# Patient Record
Sex: MALE | URBAN - METROPOLITAN AREA
[De-identification: names, ages, dates, MRNs, and addresses within clinical notes are randomized per-mention and may not be internally consistent; named-entity substitution may affect disease eponyms.]

---

## 2020-10-13 ENCOUNTER — OFFICE VISIT (OUTPATIENT)
Dept: URBAN - METROPOLITAN AREA CLINIC 52 | Facility: CLINIC | Age: 70
End: 2020-10-13
Payer: MEDICARE

## 2020-10-13 DIAGNOSIS — H25.813 COMBINED FORMS OF AGE-RELATED CATARACT, BILATERAL: ICD-10-CM

## 2020-10-13 DIAGNOSIS — E11.9 TYPE 2 DIABETES MELLITUS WITHOUT COMPLICATIONS: Primary | ICD-10-CM

## 2020-10-13 DIAGNOSIS — H43.813 VITREOUS DEGENERATION, BILATERAL: ICD-10-CM

## 2020-10-13 PROCEDURE — 92004 COMPRE OPH EXAM NEW PT 1/>: CPT | Performed by: OPHTHALMOLOGY

## 2020-10-13 ASSESSMENT — INTRAOCULAR PRESSURE
OD: 16
OD: 13
OS: 12
OS: 16

## 2020-10-13 NOTE — IMPRESSION/PLAN
Impression: Type 2 diabetes mellitus without complications: P15.6. Plan: Diabetes: no background retinopathy, no signs of neovascularization noted. Discussed ocular and systemic benefits of blood sugar control.

## 2021-10-21 ENCOUNTER — OFFICE VISIT (OUTPATIENT)
Dept: URBAN - METROPOLITAN AREA CLINIC 52 | Facility: CLINIC | Age: 71
End: 2021-10-21
Payer: MEDICARE

## 2021-10-21 PROCEDURE — 92014 COMPRE OPH EXAM EST PT 1/>: CPT | Performed by: OPHTHALMOLOGY

## 2021-10-21 PROCEDURE — 92134 CPTRZ OPH DX IMG PST SGM RTA: CPT | Performed by: OPHTHALMOLOGY

## 2021-10-21 ASSESSMENT — INTRAOCULAR PRESSURE
OD: 16
OS: 13
OD: 14
OS: 16

## 2021-10-21 NOTE — IMPRESSION/PLAN
Impression: Type 2 diabetes mellitus without complications: N22.6. Plan: Diabetes: no background retinopathy, no signs of neovascularization noted. Discussed ocular and systemic benefits of blood sugar control. MAC OCT obtained today and reviewed with patient, no DME OU.

## 2021-12-21 ENCOUNTER — APPOINTMENT (RX ONLY)
Dept: URBAN - METROPOLITAN AREA CLINIC 142 | Facility: CLINIC | Age: 71
Setting detail: DERMATOLOGY
End: 2021-12-21

## 2021-12-21 DIAGNOSIS — L82.1 OTHER SEBORRHEIC KERATOSIS: ICD-10-CM

## 2021-12-21 DIAGNOSIS — Z71.89 OTHER SPECIFIED COUNSELING: ICD-10-CM

## 2021-12-21 PROCEDURE — 99202 OFFICE O/P NEW SF 15 MIN: CPT

## 2021-12-21 PROCEDURE — ? COUNSELING

## 2021-12-21 ASSESSMENT — LOCATION DETAILED DESCRIPTION DERM
LOCATION DETAILED: RIGHT CLAVICULAR SKIN
LOCATION DETAILED: INFERIOR THORACIC SPINE

## 2021-12-21 ASSESSMENT — LOCATION SIMPLE DESCRIPTION DERM
LOCATION SIMPLE: RIGHT CLAVICULAR SKIN
LOCATION SIMPLE: UPPER BACK

## 2021-12-21 ASSESSMENT — LOCATION ZONE DERM: LOCATION ZONE: TRUNK

## 2022-10-24 ENCOUNTER — OFFICE VISIT (OUTPATIENT)
Dept: URBAN - METROPOLITAN AREA CLINIC 52 | Facility: CLINIC | Age: 72
End: 2022-10-24
Payer: MEDICARE

## 2022-10-24 DIAGNOSIS — H25.813 COMBINED FORMS OF AGE-RELATED CATARACT, BILATERAL: ICD-10-CM

## 2022-10-24 DIAGNOSIS — E11.9 TYPE 2 DIABETES MELLITUS WITHOUT COMPLICATIONS: Primary | ICD-10-CM

## 2022-10-24 DIAGNOSIS — H43.813 VITREOUS DEGENERATION, BILATERAL: ICD-10-CM

## 2022-10-24 PROCEDURE — 99214 OFFICE O/P EST MOD 30 MIN: CPT | Performed by: OPHTHALMOLOGY

## 2022-10-24 PROCEDURE — 92134 CPTRZ OPH DX IMG PST SGM RTA: CPT | Performed by: OPHTHALMOLOGY

## 2022-10-24 ASSESSMENT — INTRAOCULAR PRESSURE
OD: 16
OS: 16
OD: 13
OS: 14

## 2022-10-24 NOTE — IMPRESSION/PLAN
Impression: Type 2 diabetes mellitus without complications: Y82.6. Plan: Diabetes: no background retinopathy, no signs of neovascularization noted. Discussed ocular and systemic benefits of blood sugar control. MAC OCT obtained today and reviewed with patient, no DME OU.

## 2022-12-23 ENCOUNTER — OFFICE VISIT (OUTPATIENT)
Facility: LOCATION | Age: 72
End: 2022-12-23
Payer: MEDICARE

## 2022-12-23 DIAGNOSIS — H43.813 VITREOUS DEGENERATION, BILATERAL: ICD-10-CM

## 2022-12-23 DIAGNOSIS — R51.9 HEADACHE, UNSPECIFIED: Primary | ICD-10-CM

## 2022-12-23 DIAGNOSIS — E11.9 TYPE 2 DIABETES MELLITUS WITHOUT COMPLICATIONS: ICD-10-CM

## 2022-12-23 DIAGNOSIS — H25.813 COMBINED FORMS OF AGE-RELATED CATARACT, BILATERAL: ICD-10-CM

## 2022-12-23 PROCEDURE — 92133 CPTRZD OPH DX IMG PST SGM ON: CPT

## 2022-12-23 PROCEDURE — 99204 OFFICE O/P NEW MOD 45 MIN: CPT

## 2022-12-23 ASSESSMENT — INTRAOCULAR PRESSURE
OS: 11
OD: 9

## 2022-12-23 ASSESSMENT — KERATOMETRY
OS: 40.38
OD: 40.50

## 2022-12-23 NOTE — IMPRESSION/PLAN
Impression: Headache, unspecified: R51.9. Plan: PCP concerned for GCA. Pt has headaches with temporal sensitivity and jaw claudication. PCP has already performed labs, elevated c-reactive protein and ESR. PCP referred over for temporal artery biopsy. VA stable to last visit OU. No OHN swelling on RNFL OCT. No hemorrhages see on DFE. Pt referred to Dr. Aileen Whittington for GCA workup.

## 2022-12-23 NOTE — IMPRESSION/PLAN
Impression: Vitreous degeneration, bilateral: H43.813. Plan: Pt educated on stable findings. No retinal holes/breaks/tears/detachments seen today. Pt re-educated on signs/symptoms of a retinal detachment, including new flashes of light, changes in floaters, and/or a curtain in vision. If s/s noted, present to clinic or emergency room STAT. Pt expressed understanding.

## 2022-12-23 NOTE — IMPRESSION/PLAN
Impression: Type 2 diabetes mellitus without complications: L98.8. Plan: Pt educated on stable findings. Stressed importance of BS/BP control and continued care with PCP/endocrinologist. Will write letter to PCP/endocrinologist regarding exam findings.  RTC 1-year for annual diabetic eye exam.

## 2023-01-04 ENCOUNTER — OFFICE VISIT (OUTPATIENT)
Facility: LOCATION | Age: 73
End: 2023-01-04
Payer: MEDICARE

## 2023-01-04 DIAGNOSIS — M31.6 OTHER GIANT CELL ARTERITIS: Primary | ICD-10-CM

## 2023-01-04 PROCEDURE — 99204 OFFICE O/P NEW MOD 45 MIN: CPT | Performed by: OPHTHALMOLOGY

## 2023-01-04 PROCEDURE — 92133 CPTRZD OPH DX IMG PST SGM ON: CPT | Performed by: OPHTHALMOLOGY

## 2023-01-04 ASSESSMENT — INTRAOCULAR PRESSURE
OS: 18
OD: 18

## 2023-01-04 NOTE — IMPRESSION/PLAN
Impression: Other giant cell arteritis: M31.6. Plan: Patient examined. Chart reviewed. CRP & Sedate in normal range. 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 Lab work reveals elevated CRP & sed rate advised no biopsy but treatment for temporal arteritis, requires 1 year minimum of oral steroids with PCP. RV 1 month for Follow Up & VF 30-2.

## 2023-10-26 ENCOUNTER — OFFICE VISIT (OUTPATIENT)
Facility: LOCATION | Age: 73
End: 2023-10-26
Payer: MEDICARE

## 2023-10-26 DIAGNOSIS — H25.813 COMBINED FORMS OF AGE-RELATED CATARACT, BILATERAL: ICD-10-CM

## 2023-10-26 DIAGNOSIS — M31.6 OTHER GIANT CELL ARTERITIS: ICD-10-CM

## 2023-10-26 DIAGNOSIS — E11.9 TYPE 2 DIABETES MELLITUS WITHOUT COMPLICATIONS: Primary | ICD-10-CM

## 2023-10-26 DIAGNOSIS — H43.813 VITREOUS DEGENERATION, BILATERAL: ICD-10-CM

## 2023-10-26 DIAGNOSIS — H35.033 HYPERTENSIVE RETINOPATHY, BILATERAL: ICD-10-CM

## 2023-10-26 PROCEDURE — 92014 COMPRE OPH EXAM EST PT 1/>: CPT | Performed by: OPTOMETRIST

## 2023-10-26 ASSESSMENT — VISUAL ACUITY
OS: 20/25
OD: 20/25

## 2023-10-26 ASSESSMENT — INTRAOCULAR PRESSURE
OD: 14
OS: 15

## 2024-07-10 ENCOUNTER — APPOINTMENT (RX ONLY)
Dept: URBAN - METROPOLITAN AREA CLINIC 142 | Facility: CLINIC | Age: 74
Setting detail: DERMATOLOGY
End: 2024-07-10

## 2024-07-10 DIAGNOSIS — B07.8 OTHER VIRAL WARTS: ICD-10-CM

## 2024-07-10 PROCEDURE — 17110 DESTRUCTION B9 LES UP TO 14: CPT

## 2024-07-10 PROCEDURE — ? COUNSELING

## 2024-07-10 PROCEDURE — ? LIQUID NITROGEN

## 2024-07-10 ASSESSMENT — LOCATION ZONE DERM: LOCATION ZONE: TRUNK

## 2024-07-10 ASSESSMENT — LOCATION DETAILED DESCRIPTION DERM: LOCATION DETAILED: LEFT MEDIAL INFERIOR CHEST

## 2024-07-10 ASSESSMENT — LOCATION SIMPLE DESCRIPTION DERM: LOCATION SIMPLE: CHEST

## 2024-07-10 NOTE — PROCEDURE: LIQUID NITROGEN
Medical Necessity Clause: This procedure was medically necessary because the lesions that were treated were:
Spray Paint Technique: No
Show Aperture Variable?: Yes
Detail Level: Detailed
Medical Necessity Information: It is in your best interest to select a reason for this procedure from the list below. All of these items fulfill various CMS LCD requirements except the new and changing color options.
Consent: The patient's consent was obtained including but not limited to risks of crusting, scabbing, blistering, scarring, darker or lighter pigmentary change, recurrence, incomplete removal and infection.
Spray Paint Text: The liquid nitrogen was applied to the skin utilizing a spray paint frosting technique.
Post-Care Instructions: I reviewed with the patient in detail post-care instructions. Patient is to wear sun protection, and avoid picking at any of the treated lesions. Patient may apply Vaseline to crusted or scabbing areas.
Application Tool (Optional): Liquid Nitrogen Sprayer

## 2024-08-09 ENCOUNTER — APPOINTMENT (RX ONLY)
Dept: URBAN - METROPOLITAN AREA CLINIC 142 | Facility: CLINIC | Age: 74
Setting detail: DERMATOLOGY
End: 2024-08-09

## 2024-08-09 DIAGNOSIS — L81.4 OTHER MELANIN HYPERPIGMENTATION: ICD-10-CM

## 2024-08-09 DIAGNOSIS — L82.1 OTHER SEBORRHEIC KERATOSIS: ICD-10-CM

## 2024-08-09 DIAGNOSIS — L91.8 OTHER HYPERTROPHIC DISORDERS OF THE SKIN: ICD-10-CM

## 2024-08-09 DIAGNOSIS — B07.8 OTHER VIRAL WARTS: ICD-10-CM | Status: RESOLVED

## 2024-08-09 DIAGNOSIS — D22 MELANOCYTIC NEVI: ICD-10-CM

## 2024-08-09 DIAGNOSIS — D18.0 HEMANGIOMA: ICD-10-CM

## 2024-08-09 PROBLEM — D18.01 HEMANGIOMA OF SKIN AND SUBCUTANEOUS TISSUE: Status: ACTIVE | Noted: 2024-08-09

## 2024-08-09 PROBLEM — D22.5 MELANOCYTIC NEVI OF TRUNK: Status: ACTIVE | Noted: 2024-08-09

## 2024-08-09 PROCEDURE — 99213 OFFICE O/P EST LOW 20 MIN: CPT

## 2024-08-09 PROCEDURE — ? COUNSELING

## 2024-08-09 PROCEDURE — ? TREATMENT REGIMEN

## 2024-08-09 ASSESSMENT — LOCATION SIMPLE DESCRIPTION DERM
LOCATION SIMPLE: RIGHT UPPER BACK
LOCATION SIMPLE: RIGHT FOREARM
LOCATION SIMPLE: RIGHT THIGH
LOCATION SIMPLE: CHEST
LOCATION SIMPLE: LEFT LOWER BACK

## 2024-08-09 ASSESSMENT — LOCATION DETAILED DESCRIPTION DERM
LOCATION DETAILED: LEFT MEDIAL INFERIOR CHEST
LOCATION DETAILED: LEFT INFERIOR MEDIAL MIDBACK
LOCATION DETAILED: RIGHT MID-UPPER BACK
LOCATION DETAILED: RIGHT ANTERIOR MEDIAL PROXIMAL THIGH
LOCATION DETAILED: RIGHT PROXIMAL DORSAL FOREARM
LOCATION DETAILED: RIGHT INFERIOR UPPER BACK

## 2024-08-09 ASSESSMENT — LOCATION ZONE DERM
LOCATION ZONE: TRUNK
LOCATION ZONE: ARM
LOCATION ZONE: LEG

## 2024-08-09 NOTE — PROCEDURE: TREATMENT REGIMEN
Plan: Quoted $173 for cosmetic removal of up to 10 lesions
Detail Level: Zone
Detail Level: Detailed

## 2024-09-04 ENCOUNTER — APPOINTMENT (RX ONLY)
Dept: URBAN - METROPOLITAN AREA CLINIC 142 | Facility: CLINIC | Age: 74
Setting detail: DERMATOLOGY
End: 2024-09-04

## 2024-09-04 DIAGNOSIS — D18.0 HEMANGIOMA: ICD-10-CM

## 2024-09-04 DIAGNOSIS — L91.8 OTHER HYPERTROPHIC DISORDERS OF THE SKIN: ICD-10-CM

## 2024-09-04 PROBLEM — D18.01 HEMANGIOMA OF SKIN AND SUBCUTANEOUS TISSUE: Status: ACTIVE | Noted: 2024-09-04

## 2024-09-04 PROCEDURE — ? BENIGN DESTRUCTION COSMETIC

## 2024-09-04 PROCEDURE — ? INVENTORY

## 2024-09-04 ASSESSMENT — LOCATION DETAILED DESCRIPTION DERM
LOCATION DETAILED: RIGHT MID-UPPER BACK
LOCATION DETAILED: RIGHT SUPERIOR UPPER BACK
LOCATION DETAILED: LEFT INFERIOR UPPER BACK
LOCATION DETAILED: LEFT SUPERIOR MEDIAL LOWER BACK
LOCATION DETAILED: LEFT SUPERIOR MEDIAL MIDBACK
LOCATION DETAILED: LEFT MID-UPPER BACK
LOCATION DETAILED: SUPERIOR LUMBAR SPINE
LOCATION DETAILED: LEFT INFERIOR LATERAL MIDBACK
LOCATION DETAILED: LEFT SUPERIOR LATERAL UPPER BACK
LOCATION DETAILED: RIGHT INFERIOR LATERAL MIDBACK
LOCATION DETAILED: LEFT INFERIOR MEDIAL MIDBACK
LOCATION DETAILED: RIGHT INFERIOR UPPER BACK
LOCATION DETAILED: RIGHT LATERAL UPPER BACK
LOCATION DETAILED: RIGHT ANTERIOR PROXIMAL THIGH

## 2024-09-04 ASSESSMENT — LOCATION ZONE DERM
LOCATION ZONE: TRUNK
LOCATION ZONE: LEG

## 2024-09-04 ASSESSMENT — LOCATION SIMPLE DESCRIPTION DERM
LOCATION SIMPLE: LOWER BACK
LOCATION SIMPLE: RIGHT LOWER BACK
LOCATION SIMPLE: RIGHT THIGH
LOCATION SIMPLE: RIGHT UPPER BACK
LOCATION SIMPLE: LEFT UPPER BACK
LOCATION SIMPLE: LEFT LOWER BACK

## 2024-09-04 NOTE — PROCEDURE: BENIGN DESTRUCTION COSMETIC
Post-Care Instructions: I reviewed with the patient in detail post-care instructions. Patient is to wear sunprotection, and avoid picking at any of the treated lesions. Pt may apply Vaseline to crusted or scabbing areas.
Anesthesia Type: 1% lidocaine with 1:100,000 epinephrine
Anesthesia Volume In Cc: 0.5
Detail Level: Detailed
Consent: The patient's consent was obtained including but not limited to risks of crusting, scabbing, blistering, scarring, darker or lighter pigmentary change, recurrence, incomplete removal and infection.

## 2024-10-28 ENCOUNTER — OFFICE VISIT (OUTPATIENT)
Facility: LOCATION | Age: 74
End: 2024-10-28
Payer: MEDICARE

## 2024-10-28 DIAGNOSIS — H35.3131 NONEXUDATIVE MACULAR DEGENERATION, EARLY DRY STAGE, BILATERAL: ICD-10-CM

## 2024-10-28 DIAGNOSIS — H35.033 HYPERTENSIVE RETINOPATHY, BILATERAL: ICD-10-CM

## 2024-10-28 PROCEDURE — 92134 CPTRZ OPH DX IMG PST SGM RTA: CPT | Performed by: OPTOMETRIST

## 2024-10-28 PROCEDURE — 92014 COMPRE OPH EXAM EST PT 1/>: CPT | Performed by: OPTOMETRIST

## 2024-10-28 ASSESSMENT — INTRAOCULAR PRESSURE
OD: 16
OS: 17

## 2024-10-28 ASSESSMENT — VISUAL ACUITY
OD: 20/40
OS: 20/30

## 2024-10-29 ENCOUNTER — TECH ONLY (OUTPATIENT)
Facility: LOCATION | Age: 74
End: 2024-10-29
Payer: MEDICARE

## 2024-10-29 DIAGNOSIS — E11.9 TYPE 2 DIABETES MELLITUS WITHOUT COMPLICATIONS: ICD-10-CM

## 2024-10-29 ASSESSMENT — PACHYMETRY
OD: 2.87
OD: 24.86
OS: 24.76
OS: 3.31

## 2024-10-30 ENCOUNTER — OFFICE VISIT (OUTPATIENT)
Facility: LOCATION | Age: 74
End: 2024-10-30
Payer: MEDICARE

## 2024-10-30 DIAGNOSIS — H25.813 COMBINED FORMS OF AGE-RELATED CATARACT, BILATERAL: Primary | ICD-10-CM

## 2024-10-30 PROCEDURE — 99214 OFFICE O/P EST MOD 30 MIN: CPT | Performed by: OPHTHALMOLOGY

## 2024-10-30 RX ORDER — PREDNISOLONE ACETATE 10 MG/ML
1 % SUSPENSION/ DROPS OPHTHALMIC
Qty: 10 | Refills: 1 | Status: ACTIVE
Start: 2024-10-30

## 2024-10-30 ASSESSMENT — KERATOMETRY
OD: 40.50
OS: 41.00

## 2024-10-30 ASSESSMENT — INTRAOCULAR PRESSURE
OS: 16
OD: 15

## 2024-10-30 ASSESSMENT — VISUAL ACUITY
OS: 20/30
OD: 20/40

## 2024-12-05 ENCOUNTER — SURGERY (OUTPATIENT)
Dept: URBAN - METROPOLITAN AREA SURGERY 28 | Facility: LOCATION | Age: 74
End: 2024-12-05
Payer: MEDICARE

## 2024-12-05 PROCEDURE — PR1CP PR1CP: CUSTOM | Performed by: OPHTHALMOLOGY

## 2024-12-05 PROCEDURE — 66984 XCAPSL CTRC RMVL W/O ECP: CPT | Performed by: OPHTHALMOLOGY

## 2024-12-06 ENCOUNTER — POST-OPERATIVE VISIT (OUTPATIENT)
Facility: LOCATION | Age: 74
End: 2024-12-06
Payer: MEDICARE

## 2024-12-06 DIAGNOSIS — Z48.810 ENCOUNTER FOR SURGICAL AFTERCARE FOLLOWING SURGERY ON A SENSE ORGAN: Primary | ICD-10-CM

## 2024-12-06 ASSESSMENT — INTRAOCULAR PRESSURE
OD: 21
OS: 15
OD: 24
OS: 19

## 2024-12-12 ENCOUNTER — POST-OPERATIVE VISIT (OUTPATIENT)
Facility: LOCATION | Age: 74
End: 2024-12-12
Payer: MEDICARE

## 2024-12-12 DIAGNOSIS — H35.3131 NONEXUDATIVE AGE-RELATED MACULAR DEGENERATION, BILATERAL, EARLY DRY STAGE: ICD-10-CM

## 2024-12-12 DIAGNOSIS — Z48.810 ENCOUNTER FOR SURGICAL AFTERCARE FOLLOWING SURGERY ON A SENSE ORGAN: Primary | ICD-10-CM

## 2024-12-12 DIAGNOSIS — H52.4 PRESBYOPIA: ICD-10-CM

## 2024-12-12 PROCEDURE — 92015 DETERMINE REFRACTIVE STATE: CPT | Performed by: OPTOMETRIST

## 2024-12-12 PROCEDURE — 92134 CPTRZ OPH DX IMG PST SGM RTA: CPT | Performed by: OPTOMETRIST

## 2024-12-12 ASSESSMENT — VISUAL ACUITY
OD: 20/50
OS: 20/25

## 2024-12-12 ASSESSMENT — INTRAOCULAR PRESSURE
OD: 15
OS: 13

## 2024-12-17 ENCOUNTER — SURGERY (OUTPATIENT)
Dept: URBAN - METROPOLITAN AREA SURGERY 28 | Facility: LOCATION | Age: 74
End: 2024-12-17
Payer: MEDICARE

## 2024-12-17 PROCEDURE — PR1CP PR1CP: CUSTOM | Performed by: OPHTHALMOLOGY

## 2024-12-17 PROCEDURE — 66984 XCAPSL CTRC RMVL W/O ECP: CPT | Performed by: OPHTHALMOLOGY

## 2024-12-18 ENCOUNTER — POST-OPERATIVE VISIT (OUTPATIENT)
Facility: LOCATION | Age: 74
End: 2024-12-18
Payer: MEDICARE

## 2024-12-18 DIAGNOSIS — Z96.1 PRESENCE OF INTRAOCULAR LENS: Primary | ICD-10-CM

## 2024-12-18 ASSESSMENT — INTRAOCULAR PRESSURE
OD: 13
OS: 20
OD: 16
OS: 16

## 2024-12-24 ENCOUNTER — POST-OPERATIVE VISIT (OUTPATIENT)
Facility: LOCATION | Age: 74
End: 2024-12-24
Payer: MEDICARE

## 2024-12-24 DIAGNOSIS — Z96.1 PRESENCE OF INTRAOCULAR LENS: ICD-10-CM

## 2024-12-24 DIAGNOSIS — H52.4 PRESBYOPIA: Primary | ICD-10-CM

## 2024-12-24 PROCEDURE — 92015 DETERMINE REFRACTIVE STATE: CPT | Performed by: OPHTHALMOLOGY

## 2024-12-24 PROCEDURE — 99024 POSTOP FOLLOW-UP VISIT: CPT | Performed by: OPHTHALMOLOGY

## 2024-12-24 ASSESSMENT — INTRAOCULAR PRESSURE
OD: 18
OS: 17

## 2024-12-24 ASSESSMENT — VISUAL ACUITY
OS: 20/25
OD: 20/25

## 2025-01-14 ENCOUNTER — POST-OPERATIVE VISIT (OUTPATIENT)
Facility: LOCATION | Age: 75
End: 2025-01-14
Payer: MEDICARE

## 2025-01-14 DIAGNOSIS — H59.032 CYSTOID MACULAR EDEMA FOLLOWING CATARACT SURGERY, LEFT EYE: Primary | ICD-10-CM

## 2025-01-14 DIAGNOSIS — Z96.1 PRESENCE OF INTRAOCULAR LENS: ICD-10-CM

## 2025-01-14 DIAGNOSIS — H52.4 PRESBYOPIA: ICD-10-CM

## 2025-01-14 PROCEDURE — 92015 DETERMINE REFRACTIVE STATE: CPT | Performed by: OPTOMETRIST

## 2025-01-14 PROCEDURE — 92134 CPTRZ OPH DX IMG PST SGM RTA: CPT | Performed by: OPTOMETRIST

## 2025-01-14 RX ORDER — PREDNISOLONE ACETATE 10 MG/ML
1 % SUSPENSION/ DROPS OPHTHALMIC
Qty: 10 | Refills: 1 | Status: ACTIVE
Start: 2025-01-14

## 2025-01-14 ASSESSMENT — INTRAOCULAR PRESSURE
OD: 16
OS: 16

## 2025-01-14 ASSESSMENT — KERATOMETRY
OD: 40.25
OS: 38.50

## 2025-01-14 ASSESSMENT — VISUAL ACUITY
OS: 20/30
OD: 20/20

## 2025-02-03 ENCOUNTER — OFFICE VISIT (OUTPATIENT)
Facility: LOCATION | Age: 75
End: 2025-02-03
Payer: COMMERCIAL

## 2025-02-03 DIAGNOSIS — H52.4 PRESBYOPIA: Primary | ICD-10-CM

## 2025-02-03 PROCEDURE — 92014 COMPRE OPH EXAM EST PT 1/>: CPT | Performed by: OPTOMETRIST

## 2025-02-03 ASSESSMENT — INTRAOCULAR PRESSURE
OD: 15
OS: 15

## 2025-02-03 ASSESSMENT — KERATOMETRY
OS: 41.50
OD: 40.50

## 2025-02-03 ASSESSMENT — VISUAL ACUITY
OS: 20/25
OD: 20/20

## 2025-02-14 ENCOUNTER — POST-OPERATIVE VISIT (OUTPATIENT)
Facility: LOCATION | Age: 75
End: 2025-02-14
Payer: MEDICARE

## 2025-02-14 DIAGNOSIS — H52.4 PRESBYOPIA: Primary | ICD-10-CM

## 2025-02-14 DIAGNOSIS — Z96.1 PRESENCE OF INTRAOCULAR LENS: ICD-10-CM

## 2025-02-14 PROCEDURE — 92015 DETERMINE REFRACTIVE STATE: CPT | Performed by: OPTOMETRIST

## 2025-02-14 PROCEDURE — 92134 CPTRZ OPH DX IMG PST SGM RTA: CPT | Performed by: OPTOMETRIST

## 2025-02-14 ASSESSMENT — VISUAL ACUITY
OD: 20/25
OS: 20/25

## 2025-02-14 ASSESSMENT — INTRAOCULAR PRESSURE
OS: 17
OD: 16

## 2025-05-28 ENCOUNTER — OFFICE VISIT (OUTPATIENT)
Facility: LOCATION | Age: 75
End: 2025-05-28
Payer: MEDICARE

## 2025-05-28 DIAGNOSIS — H35.3131 NONEXUDATIVE AGE-RELATED MACULAR DEGENERATION, BILATERAL, EARLY DRY STAGE: ICD-10-CM

## 2025-05-28 DIAGNOSIS — E11.9 TYPE 2 DIABETES MELLITUS WITHOUT COMPLICATIONS: Primary | ICD-10-CM

## 2025-05-28 DIAGNOSIS — H04.123 TEAR FILM INSUFFICIENCY OF BILATERAL LACRIMAL GLANDS: ICD-10-CM

## 2025-05-28 DIAGNOSIS — H43.813 VITREOUS DEGENERATION, BILATERAL: ICD-10-CM

## 2025-05-28 PROCEDURE — 92014 COMPRE OPH EXAM EST PT 1/>: CPT | Performed by: OPTOMETRIST

## 2025-05-28 ASSESSMENT — KERATOMETRY
OS: 39.38
OD: 39.38

## 2025-05-28 ASSESSMENT — VISUAL ACUITY
OS: 20/20
OD: 20/20

## 2025-05-28 ASSESSMENT — INTRAOCULAR PRESSURE
OS: 12
OD: 11